# Patient Record
Sex: MALE
[De-identification: names, ages, dates, MRNs, and addresses within clinical notes are randomized per-mention and may not be internally consistent; named-entity substitution may affect disease eponyms.]

---

## 2022-10-13 ENCOUNTER — DASHBOARD ENCOUNTERS (OUTPATIENT)
Age: 67
End: 2022-10-13

## 2022-10-13 ENCOUNTER — OFFICE VISIT (OUTPATIENT)
Dept: URBAN - METROPOLITAN AREA CLINIC 82 | Facility: CLINIC | Age: 67
End: 2022-10-13
Payer: MEDICARE

## 2022-10-13 ENCOUNTER — LAB OUTSIDE AN ENCOUNTER (OUTPATIENT)
Dept: URBAN - METROPOLITAN AREA CLINIC 82 | Facility: CLINIC | Age: 67
End: 2022-10-13

## 2022-10-13 VITALS
DIASTOLIC BLOOD PRESSURE: 71 MMHG | SYSTOLIC BLOOD PRESSURE: 104 MMHG | BODY MASS INDEX: 29.03 KG/M2 | HEART RATE: 75 BPM | HEIGHT: 70 IN | WEIGHT: 202.8 LBS | TEMPERATURE: 97.3 F

## 2022-10-13 DIAGNOSIS — Z72.0 TOBACCO USE: ICD-10-CM

## 2022-10-13 DIAGNOSIS — R19.5 POSITIVE FIT (FECAL IMMUNOCHEMICAL TEST): ICD-10-CM

## 2022-10-13 PROBLEM — 365980008: Status: ACTIVE | Noted: 2022-10-13

## 2022-10-13 PROCEDURE — 99242 OFF/OP CONSLTJ NEW/EST SF 20: CPT | Performed by: STUDENT IN AN ORGANIZED HEALTH CARE EDUCATION/TRAINING PROGRAM

## 2022-10-13 PROCEDURE — 99203 OFFICE O/P NEW LOW 30 MIN: CPT | Performed by: STUDENT IN AN ORGANIZED HEALTH CARE EDUCATION/TRAINING PROGRAM

## 2022-10-13 NOTE — PHYSICAL EXAM HENT:
normocephalic, atraumatic, Face within normal limits,  , External ears within normal limits, no drainage bilaterally,  External nose  normal appearance,

## 2022-10-13 NOTE — PHYSICAL EXAM GASTROINTESTINAL
Abdomen soft, nontender in RUQ/RLQ/LUQ/LLQ, nondistended , no guarding or rigidity , no masses palpable , unremarkable bowel sounds , no hepatosplenomegaly

## 2022-10-13 NOTE — HPI-TODAY'S VISIT:
68 y/o male was referred by Dr. Carmelo Reid for an evaluation of +FIT test.  A copy of this note will be sent to the referring physican.  At today visit, patient denies any GI symptoms including: abd pain, n/v, changes in bowel habits, or weight loss. Patient is color-blind, unable to recognize any change in stool color. No indigestion or acid reflux. Recent labs completed with PCP showed no anemia or elevated liver ezymes. Patient denies any previous colonoscopy. No Fhx of CRC or IBD. Patient is on Eliqquis 5mg BID for A-fib. Admits drinking socially and smoking 4-5 cigars per day.

## 2022-10-13 NOTE — PHYSICAL EXAM CONSTITUTIONAL:
NAD, alert and oriented, well developed, well nourished, ambulating without difficulty, sitting comfortably in the chair

## 2022-10-18 ENCOUNTER — TELEPHONE ENCOUNTER (OUTPATIENT)
Dept: URBAN - METROPOLITAN AREA CLINIC 82 | Facility: CLINIC | Age: 67
End: 2022-10-18

## 2022-10-18 ENCOUNTER — LAB OUTSIDE AN ENCOUNTER (OUTPATIENT)
Dept: URBAN - METROPOLITAN AREA CLINIC 82 | Facility: CLINIC | Age: 67
End: 2022-10-18

## 2022-10-19 PROBLEM — 59614000: Status: ACTIVE | Noted: 2022-10-13

## 2022-11-02 ENCOUNTER — OFFICE VISIT (OUTPATIENT)
Dept: URBAN - METROPOLITAN AREA SURGERY CENTER 13 | Facility: SURGERY CENTER | Age: 67
End: 2022-11-02
Payer: MEDICARE

## 2022-11-02 ENCOUNTER — CLAIMS CREATED FROM THE CLAIM WINDOW (OUTPATIENT)
Dept: URBAN - METROPOLITAN AREA CLINIC 4 | Facility: CLINIC | Age: 67
End: 2022-11-02
Payer: MEDICARE

## 2022-11-02 DIAGNOSIS — D12.8 ADENOMATOUS POLYP OF RECTUM: ICD-10-CM

## 2022-11-02 DIAGNOSIS — D12.4 ADENOMA OF DESCENDING COLON: ICD-10-CM

## 2022-11-02 DIAGNOSIS — R19.5 ABNORMAL CONSISTENCY OF STOOL: ICD-10-CM

## 2022-11-02 DIAGNOSIS — D12.2 BENIGN NEOPLASM OF ASCENDING COLON: ICD-10-CM

## 2022-11-02 DIAGNOSIS — D12.2 ADENOMA OF ASCENDING COLON: ICD-10-CM

## 2022-11-02 DIAGNOSIS — Z12.11 COLON CANCER SCREENING: ICD-10-CM

## 2022-11-02 DIAGNOSIS — D12.5 ADENOMA OF SIGMOID COLON: ICD-10-CM

## 2022-11-02 PROCEDURE — G8907 PT DOC NO EVENTS ON DISCHARG: HCPCS | Performed by: INTERNAL MEDICINE

## 2022-11-02 PROCEDURE — 45385 COLONOSCOPY W/LESION REMOVAL: CPT | Performed by: INTERNAL MEDICINE

## 2022-11-02 PROCEDURE — 88305 TISSUE EXAM BY PATHOLOGIST: CPT | Performed by: PATHOLOGY
